# Patient Record
Sex: FEMALE | Race: WHITE | Employment: OTHER | URBAN - METROPOLITAN AREA
[De-identification: names, ages, dates, MRNs, and addresses within clinical notes are randomized per-mention and may not be internally consistent; named-entity substitution may affect disease eponyms.]

---

## 2017-08-01 ENCOUNTER — APPOINTMENT (OUTPATIENT)
Dept: PHYSICAL THERAPY | Facility: HOSPITAL | Age: 52
End: 2017-08-01
Attending: OBSTETRICS & GYNECOLOGY
Payer: COMMERCIAL

## 2017-08-08 ENCOUNTER — HOSPITAL ENCOUNTER (OUTPATIENT)
Dept: PHYSICAL THERAPY | Facility: HOSPITAL | Age: 52
Setting detail: THERAPIES SERIES
End: 2017-08-08
Attending: OBSTETRICS & GYNECOLOGY
Payer: COMMERCIAL

## 2017-08-10 ENCOUNTER — APPOINTMENT (OUTPATIENT)
Dept: PHYSICAL THERAPY | Facility: HOSPITAL | Age: 52
End: 2017-08-10
Attending: OBSTETRICS & GYNECOLOGY
Payer: COMMERCIAL

## 2017-08-17 ENCOUNTER — APPOINTMENT (OUTPATIENT)
Dept: PHYSICAL THERAPY | Facility: HOSPITAL | Age: 52
End: 2017-08-17
Attending: OBSTETRICS & GYNECOLOGY
Payer: COMMERCIAL

## 2017-08-22 ENCOUNTER — APPOINTMENT (OUTPATIENT)
Dept: PHYSICAL THERAPY | Facility: HOSPITAL | Age: 52
End: 2017-08-22
Attending: OBSTETRICS & GYNECOLOGY
Payer: COMMERCIAL

## 2018-06-21 ENCOUNTER — OFFICE VISIT (OUTPATIENT)
Dept: OBGYN CLINIC | Facility: CLINIC | Age: 53
End: 2018-06-21

## 2018-06-21 VITALS
DIASTOLIC BLOOD PRESSURE: 66 MMHG | SYSTOLIC BLOOD PRESSURE: 118 MMHG | BODY MASS INDEX: 24.04 KG/M2 | WEIGHT: 134 LBS | HEART RATE: 72 BPM | HEIGHT: 62.5 IN

## 2018-06-21 DIAGNOSIS — N95.1 HOT FLASHES DUE TO MENOPAUSE: Primary | ICD-10-CM

## 2018-06-21 PROCEDURE — 99386 PREV VISIT NEW AGE 40-64: CPT | Performed by: OBSTETRICS & GYNECOLOGY

## 2018-06-21 RX ORDER — ESTERIFIED ESTROGEN AND METHYLTESTOSTERONE .625; 1.25 MG/1; MG/1
1 TABLET ORAL DAILY
Qty: 30 TABLET | Refills: 11 | Status: SHIPPED | OUTPATIENT
Start: 2018-06-21 | End: 2018-08-09 | Stop reason: DRUGHIGH

## 2018-06-21 NOTE — PROGRESS NOTES
Dawit Sales is a 46year old female [id number] Patient's last menstrual period was 08/05/2007. Patient presents with:  Gyn Problem: hotflashes wt gain  . She has no complaints. Denies postmenopausal bleeding, urinary or sexual issues.   She complains of ho HISTORY:  Family History   Problem Relation Age of Onset   • Heart Disease Father    No breast cancer    MEDICATIONS:    Current Outpatient Prescriptions:   •  Estradiol (DIVIGEL) 1 MG/GM Transdermal Gel, APPLY 1MG TOPICALLY DAILY.  APPLY TO BACK OF THIGH A

## 2018-06-22 ENCOUNTER — TELEPHONE (OUTPATIENT)
Dept: OBGYN CLINIC | Facility: CLINIC | Age: 53
End: 2018-06-22

## 2018-06-27 NOTE — TELEPHONE ENCOUNTER
Patient aware that PA was denied. Case Number [id number]. Good RX coupon info provided to patient. Patient verbalizes understanding.

## 2018-07-23 ENCOUNTER — TELEPHONE (OUTPATIENT)
Dept: OBGYN CLINIC | Facility: CLINIC | Age: 53
End: 2018-07-23

## 2018-07-23 NOTE — TELEPHONE ENCOUNTER
Patient states that her hot flushes are much better but wants to know if she can increase her dose of Estratest. Will check with Dr. Kelly Bang and call patient back.

## 2018-07-26 NOTE — TELEPHONE ENCOUNTER
----- Message from Bryan Dean sent at 7/26/2018  2:45 PM CDT -----  Returning lata call re:test results

## 2018-07-26 NOTE — TELEPHONE ENCOUNTER
Patient informed that higher dose of Estratest was authorized per Dr. Georgette Flowers. Patient will call back once she is ready for her refill.

## 2018-08-08 ENCOUNTER — TELEPHONE (OUTPATIENT)
Dept: OBGYN CLINIC | Facility: CLINIC | Age: 53
End: 2018-08-08

## 2018-08-08 RX ORDER — ESTERIFIED ESTROGEN AND METHYLTESTOSTERONE 1.25; 2.5 MG/1; MG/1
1 TABLET ORAL DAILY
Qty: 30 TABLET | Refills: 10 | Status: SHIPPED | OUTPATIENT
Start: 2018-08-08 | End: 2019-02-12
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
## 2018-08-10 ENCOUNTER — TELEPHONE (OUTPATIENT)
Dept: OBGYN CLINIC | Facility: CLINIC | Age: 53
End: 2018-08-10

## 2018-09-06 ENCOUNTER — TELEPHONE (OUTPATIENT)
Dept: OBGYN CLINIC | Facility: CLINIC | Age: 53
End: 2018-09-06

## 2019-02-13 RX ORDER — ESTERIFIED ESTROGEN AND METHYLTESTOSTERONE 1.25; 2.5 MG/1; MG/1
TABLET ORAL
Qty: 30 TABLET | Refills: 4 | OUTPATIENT
Start: 2019-02-13 | End: 2019-02-18

## 2019-02-19 RX ORDER — ESTERIFIED ESTROGEN AND METHYLTESTOSTERONE 1.25; 2.5 MG/1; MG/1
TABLET ORAL
Qty: 30 TABLET | Refills: 0 | OUTPATIENT
Start: 2019-02-19 | End: 2019-03-04

## 2019-03-04 ENCOUNTER — OFFICE VISIT (OUTPATIENT)
Dept: OBGYN CLINIC | Facility: CLINIC | Age: 54
End: 2019-03-04
Payer: COMMERCIAL

## 2019-03-04 VITALS
SYSTOLIC BLOOD PRESSURE: 118 MMHG | WEIGHT: 138 LBS | BODY MASS INDEX: 24.45 KG/M2 | HEART RATE: 67 BPM | HEIGHT: 63 IN | DIASTOLIC BLOOD PRESSURE: 70 MMHG

## 2019-03-04 DIAGNOSIS — Z01.419 WELL WOMAN EXAM WITH ROUTINE GYNECOLOGICAL EXAM: Primary | ICD-10-CM

## 2019-03-04 DIAGNOSIS — Z12.39 SCREENING FOR MALIGNANT NEOPLASM OF BREAST: ICD-10-CM

## 2019-03-04 PROCEDURE — 99396 PREV VISIT EST AGE 40-64: CPT | Performed by: OBSTETRICS & GYNECOLOGY

## 2019-03-04 RX ORDER — ESTERIFIED ESTROGEN AND METHYLTESTOSTERONE 1.25; 2.5 MG/1; MG/1
1 TABLET ORAL
Qty: 30 TABLET | Refills: 0 | Status: SHIPPED | OUTPATIENT
Start: 2019-03-04 | End: 2019-08-22

## 2019-03-04 NOTE — PROGRESS NOTES
Suzi Alvarenga is a 53year old female [id number] Patient's last menstrual period was 08/05/2007. Patient presents with:  Wellness Visit  . She has no complaints. Denies postmenopausal bleeding, urinary or sexual issues.   Very happy with the testosterone and Substance and Sexual Activity      Alcohol use: Yes        Binge frequency: Daily or almost daily        Comment: WINE DAILY      Drug use: No      Sexual activity: Not on file    Lifestyle      Physical activity:        Days per week: Not on file        M daily., Disp: 60 tablet, Rfl: 3    ALLERGIES:  No Known Allergies      Review of Systems:  Constitutional:  Denies fatigue, night sweats, hot flashed  Gastrointestinal:  denies heartburn, abdominal pain, diarrhea or constipation  Genitourinary:  denies dys

## 2019-06-04 ENCOUNTER — HOSPITAL ENCOUNTER (OUTPATIENT)
Age: 54
Discharge: HOME | End: 2019-06-04
Attending: EMERGENCY MEDICINE
Payer: COMMERCIAL

## 2019-06-04 VITALS
OXYGEN SATURATION: 100 % | TEMPERATURE: 98 F | RESPIRATION RATE: 14 BRPM | WEIGHT: 135 LBS | HEART RATE: 60 BPM | HEIGHT: 63 IN | SYSTOLIC BLOOD PRESSURE: 142 MMHG | DIASTOLIC BLOOD PRESSURE: 85 MMHG | BODY MASS INDEX: 23.92 KG/M2

## 2019-06-04 DIAGNOSIS — H10.33 ACUTE CONJUNCTIVITIS OF BOTH EYES, UNSPECIFIED ACUTE CONJUNCTIVITIS TYPE: Primary | ICD-10-CM

## 2019-06-04 PROCEDURE — 99203 OFFICE O/P NEW LOW 30 MIN: CPT

## 2019-06-04 PROCEDURE — 99213 OFFICE O/P EST LOW 20 MIN: CPT

## 2019-06-04 RX ORDER — CETIRIZINE HYDROCHLORIDE 10 MG/1
10 TABLET ORAL DAILY
COMMUNITY
End: 2021-01-28 | Stop reason: ALTCHOICE

## 2019-06-04 RX ORDER — TOBRAMYCIN 3 MG/ML
1 SOLUTION/ DROPS OPHTHALMIC
Qty: 1 BOTTLE | Refills: 0 | Status: SHIPPED | OUTPATIENT
Start: 2019-06-04 | End: 2019-06-09

## 2019-06-04 NOTE — ED PROVIDER NOTES
Patient Seen in: [facility]    History   Patient presents with:  Eye Problem    Stated Complaint: eye problem    HPI    41-year-old female complains of a couple day history of bilateral eye itching and redness.   The patient HEENT: Normocephalic. Pupils equal round reactive to light. Extraocular movements are intact. There is bilateral conjunctival injection without photophobia or active tearing, left greater than right.   Additionally there is the beginnings of a blepharit

## 2019-06-04 NOTE — ED INITIAL ASSESSMENT (HPI)
Pt c/o redness and irritation to the corner of right eye and left eye x 2 days after using a new brand mascara from her birch box. Eyes have been watery. Denies visual complaints.

## 2019-06-29 ENCOUNTER — APPOINTMENT (OUTPATIENT)
Dept: GENERAL RADIOLOGY | Age: 54
End: 2019-06-29
Attending: FAMILY MEDICINE
Payer: COMMERCIAL

## 2019-06-29 ENCOUNTER — HOSPITAL ENCOUNTER (OUTPATIENT)
Age: 54
Discharge: HOME | End: 2019-06-29
Attending: FAMILY MEDICINE
Payer: COMMERCIAL

## 2019-06-29 VITALS
DIASTOLIC BLOOD PRESSURE: 88 MMHG | HEIGHT: 63 IN | SYSTOLIC BLOOD PRESSURE: 152 MMHG | RESPIRATION RATE: 18 BRPM | OXYGEN SATURATION: 98 % | BODY MASS INDEX: 23.92 KG/M2 | WEIGHT: 135 LBS | HEART RATE: 74 BPM | TEMPERATURE: 100 F

## 2019-06-29 DIAGNOSIS — H66.90 ACUTE OTITIS MEDIA, UNSPECIFIED OTITIS MEDIA TYPE: ICD-10-CM

## 2019-06-29 DIAGNOSIS — J18.9 PNEUMONIA OF RIGHT UPPER LOBE DUE TO INFECTIOUS ORGANISM: Primary | ICD-10-CM

## 2019-06-29 PROCEDURE — 71046 X-RAY EXAM CHEST 2 VIEWS: CPT | Performed by: FAMILY MEDICINE

## 2019-06-29 PROCEDURE — 99213 OFFICE O/P EST LOW 20 MIN: CPT

## 2019-06-29 PROCEDURE — 99214 OFFICE O/P EST MOD 30 MIN: CPT

## 2019-06-29 RX ORDER — LEVOFLOXACIN 500 MG/1
500 TABLET, FILM COATED ORAL DAILY
Qty: 10 TABLET | Refills: 0 | Status: SHIPPED | OUTPATIENT
Start: 2019-06-29 | End: 2019-07-09 | Stop reason: ALTCHOICE

## 2019-06-29 RX ORDER — IBUPROFEN 600 MG/1
600 TABLET ORAL ONCE
Status: COMPLETED | OUTPATIENT
Start: 2019-06-29 | End: 2019-06-29

## 2019-06-29 NOTE — ED PROVIDER NOTES
Patient Seen in: [facility]    History   Patient presents with:  Fever (infectious)  Cough/URI  Ear Problem Pain (neurosensory)    Stated Complaint: fever and upper respiratory  infection per patient x Tuesday     HPI    53 above.    Physical Exam     ED Triage Vitals [06/29/19 0818]   /88   Pulse 74   Resp 18   Temp 100.4 °F (38 °C)   Temp src Oral   SpO2 98 %   O2 Device None (Room air)       Current:/88   Pulse 74   Temp 100.4 °F (38 °C) (Oral)   Resp 18   Ht 1 membrane is partially erythematous.               Disposition and Plan     Clinical Impression:  Pneumonia of right upper lobe due to infectious organism [facility])  (primary encounter diagnosis)  Acute otitis media, unspecified otitis media type    Disposition:

## 2019-06-29 NOTE — ED INITIAL ASSESSMENT (HPI)
Pt arrived alert and responsive. Pt c/o fever ,cough and right ear pain. Pt states symptoms started Tuesday.

## 2019-07-01 ENCOUNTER — HOSPITAL ENCOUNTER (OUTPATIENT)
Age: 54
Discharge: HOME | End: 2019-07-01
Attending: EMERGENCY MEDICINE
Payer: COMMERCIAL

## 2019-07-01 VITALS
OXYGEN SATURATION: 98 % | WEIGHT: 135 LBS | BODY MASS INDEX: 23.92 KG/M2 | DIASTOLIC BLOOD PRESSURE: 96 MMHG | HEART RATE: 54 BPM | RESPIRATION RATE: 18 BRPM | TEMPERATURE: 98 F | HEIGHT: 63 IN | SYSTOLIC BLOOD PRESSURE: 163 MMHG

## 2019-07-01 DIAGNOSIS — T50.905A MEDICATION SIDE EFFECT, INITIAL ENCOUNTER: Primary | ICD-10-CM

## 2019-07-01 PROCEDURE — 99213 OFFICE O/P EST LOW 20 MIN: CPT

## 2019-07-01 PROCEDURE — 99214 OFFICE O/P EST MOD 30 MIN: CPT

## 2019-07-01 RX ORDER — CLARITHROMYCIN 500 MG/1
500 TABLET, COATED ORAL 2 TIMES DAILY
Qty: 14 TABLET | Refills: 0 | Status: SHIPPED | OUTPATIENT
Start: 2019-07-01 | End: 2019-07-08

## 2019-07-01 NOTE — ED INITIAL ASSESSMENT (HPI)
Pt states she awoke at 0200. Pt felt tingling/ numbness. in her fingers pt states she was able to get back to sleep. Pt states she continues to have tingling . Pt also c/o nausea. Pt started levaquin  Saturday.  and is concerned that may be causing her symp

## 2019-07-01 NOTE — ED NOTES
Patient called,stating felt tongue & hands felt strange during the night also thought she was having palpitations even though she was not when check her heart rate on her Apple watch. This AM patient denies any of the above issues stated.   Patient denies a

## 2019-07-01 NOTE — ED PROVIDER NOTES
Patient Seen in: [facility]    History   Patient presents with:  Nausea/Vomiting/Diarrhea (gastrointestinal)    Stated Complaint: nausea     HPI    Patient was to the urgent care just a few days ago.   She was complaining of Family history reviewed and is not pertinent to presenting problem.     Social History    Tobacco Use      Smoking status: Never Smoker      Smokeless tobacco: Never Used    Alcohol use: Yes      Binge frequency: Daily or almost daily      Comment: WI suggest dangerous allergic reaction. I discussed with patient that  If she is having significant ill effects from the Levaquin that one could consider switching antibiotic. Biaxin would cover atypicals and be a reasonable choice.   Patient notes that she

## 2019-07-05 RX ORDER — BENZONATATE 100 MG/1
100 CAPSULE ORAL 3 TIMES DAILY PRN
Qty: 15 CAPSULE | Refills: 0 | Status: SHIPPED | OUTPATIENT
Start: 2019-07-05 | End: 2019-07-10

## 2019-07-05 NOTE — ED NOTES
Patient called and inquired about a cough suppressant  Denies any fever or chills  Denies any SOB or difficulty breathing    RN consulted Dr Virgie Newton- electronic Rx sent to pharmacy   Patient verbalized understanding to follow up with PCP or immediate care

## 2019-08-14 ENCOUNTER — OFFICE VISIT (OUTPATIENT)
Dept: PODIATRY CLINIC | Facility: CLINIC | Age: 54
End: 2019-08-14
Payer: COMMERCIAL

## 2019-08-14 DIAGNOSIS — M20.42 HAMMER TOES OF BOTH FEET: ICD-10-CM

## 2019-08-14 DIAGNOSIS — M21.621 TAILOR'S BUNION OF BOTH FEET: ICD-10-CM

## 2019-08-14 DIAGNOSIS — M79.672 FOOT PAIN, LEFT: ICD-10-CM

## 2019-08-14 DIAGNOSIS — M20.41 HAMMER TOES OF BOTH FEET: ICD-10-CM

## 2019-08-14 DIAGNOSIS — M21.622 TAILOR'S BUNION OF BOTH FEET: ICD-10-CM

## 2019-08-14 DIAGNOSIS — G57.80 NEUROMA OF THIRD INTERSPACE OF FOOT: ICD-10-CM

## 2019-08-14 DIAGNOSIS — M76.72 PERONEAL TENDONITIS, LEFT: Primary | ICD-10-CM

## 2019-08-14 DIAGNOSIS — M20.10 HAV (HALLUX ABDUCTO VALGUS), UNSPECIFIED LATERALITY: ICD-10-CM

## 2019-08-14 DIAGNOSIS — M19.079 OSTEOARTHRITIS OF ANKLE AND FOOT, UNSPECIFIED LATERALITY: ICD-10-CM

## 2019-08-14 PROCEDURE — 99213 OFFICE O/P EST LOW 20 MIN: CPT | Performed by: PODIATRIST

## 2019-08-16 ENCOUNTER — TELEPHONE (OUTPATIENT)
Dept: PODIATRY CLINIC | Facility: CLINIC | Age: 54
End: 2019-08-16

## 2019-08-16 NOTE — TELEPHONE ENCOUNTER
Contacted patient about orthotic coverage:    Per Page at [facility]#[phone]    50% coverage after $3000 deductible (not met). Patient cost for orthotics $848.     Patient will call her insurance to make sure this is correct and if she wants to proce

## 2019-08-16 NOTE — PROGRESS NOTES
Sun Shane is a 53year old female. Patient presents with: Follow - Up: Ultrasound done on 8-1-2019 with Irma Parrish. Left foot is feeling better, ankle is still swollen a little. 0/10 right now, in the mornings 4-5/10.         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    Diagnostic Ultrasound Studies (8/1/2019):  Findings show flat distal fibular groove bilaterally with otherwi

## 2019-08-22 RX ORDER — ESTERIFIED ESTROGEN AND METHYLTESTOSTERONE 1.25; 2.5 MG/1; MG/1
1 TABLET ORAL
Qty: 30 TABLET | Refills: 0 | Status: SHIPPED | OUTPATIENT
Start: 2019-08-22 | End: 2019-11-17

## 2019-11-18 RX ORDER — ESTERIFIED ESTROGEN AND METHYLTESTOSTERONE 1.25; 2.5 MG/1; MG/1
TABLET ORAL
Qty: 30 TABLET | Refills: 0 | Status: SHIPPED | OUTPATIENT
Start: 2019-11-18 | End: 2020-01-01

## 2019-11-19 ENCOUNTER — TELEPHONE (OUTPATIENT)
Dept: OBGYN CLINIC | Facility: CLINIC | Age: 54
End: 2019-11-19

## 2020-01-02 ENCOUNTER — TELEPHONE (OUTPATIENT)
Dept: OBGYN CLINIC | Facility: CLINIC | Age: 55
End: 2020-01-02

## 2020-01-02 RX ORDER — ESTERIFIED ESTROGEN AND METHYLTESTOSTERONE 1.25; 2.5 MG/1; MG/1
TABLET ORAL
Qty: 30 TABLET | Refills: 2 | Status: SHIPPED | OUTPATIENT
Start: 2020-01-02 | End: 2020-03-30

## 2020-01-02 RX ORDER — ESTERIFIED ESTROGEN AND METHYLTESTOSTERONE 1.25; 2.5 MG/1; MG/1
TABLET ORAL
Qty: 30 TABLET | Refills: 2 | OUTPATIENT
Start: 2020-01-02 | End: 2020-01-02

## 2020-03-30 RX ORDER — ESTERIFIED ESTROGEN AND METHYLTESTOSTERONE 1.25; 2.5 MG/1; MG/1
TABLET ORAL
Qty: 30 TABLET | Refills: 0 | Status: SHIPPED | OUTPATIENT
Start: 2020-03-30 | End: 2020-04-28

## 2020-04-02 ENCOUNTER — TELEPHONE (OUTPATIENT)
Dept: OBGYN CLINIC | Facility: CLINIC | Age: 55
End: 2020-04-02

## 2020-04-02 NOTE — TELEPHONE ENCOUNTER
Spoke with pharmacist regarding auto refill for this patient. This patient is currently in [place] due to the [address] place order. Filled  1 time request to [facility] in [place] and explained that to the pharmacist at [place].  Auto requ

## 2020-04-02 NOTE — TELEPHONE ENCOUNTER
Per Timmy Patricio from [place], she just called to f/u on a request for medication for the pt. Please advise and call pt.  Thanks

## 2020-04-28 RX ORDER — ESTERIFIED ESTROGEN AND METHYLTESTOSTERONE 1.25; 2.5 MG/1; MG/1
TABLET ORAL
Qty: 30 TABLET | Refills: 0 | Status: SHIPPED | OUTPATIENT
Start: 2020-04-28 | End: 2020-04-30

## 2020-04-28 NOTE — TELEPHONE ENCOUNTER
Pt just called to let us know that her pharmacy in [place] sent us a request for her medication. She made her wwe appt for June because we cancelled the appt for May. Please advise and check request from pharmacy.  Thanks

## 2020-04-30 ENCOUNTER — TELEPHONE (OUTPATIENT)
Dept: OBGYN CLINIC | Facility: CLINIC | Age: 55
End: 2020-04-30

## 2020-04-30 RX ORDER — ESTERIFIED ESTROGEN AND METHYLTESTOSTERONE 1.25; 2.5 MG/1; MG/1
TABLET ORAL
Qty: 30 TABLET | Refills: 0 | Status: SHIPPED | OUTPATIENT
Start: 2020-04-30 | End: 2020-06-02

## 2020-04-30 NOTE — TELEPHONE ENCOUNTER
Patient called regarding prescription. Routed. Called [facility] in [facility] to make sure they received. Patient aware and understanding verbalized.

## 2020-06-02 ENCOUNTER — OFFICE VISIT (OUTPATIENT)
Dept: OBGYN CLINIC | Facility: CLINIC | Age: 55
End: 2020-06-02
Payer: COMMERCIAL

## 2020-06-02 VITALS
HEIGHT: 63 IN | BODY MASS INDEX: 24.27 KG/M2 | HEART RATE: 83 BPM | WEIGHT: 137 LBS | SYSTOLIC BLOOD PRESSURE: 110 MMHG | DIASTOLIC BLOOD PRESSURE: 62 MMHG

## 2020-06-02 DIAGNOSIS — Z01.419 WELL WOMAN EXAM WITH ROUTINE GYNECOLOGICAL EXAM: ICD-10-CM

## 2020-06-02 DIAGNOSIS — Z12.31 ENCOUNTER FOR SCREENING MAMMOGRAM FOR MALIGNANT NEOPLASM OF BREAST: Primary | ICD-10-CM

## 2020-06-02 DIAGNOSIS — N95.1 HOT FLASHES DUE TO MENOPAUSE: ICD-10-CM

## 2020-06-02 PROCEDURE — 99396 PREV VISIT EST AGE 40-64: CPT | Performed by: OBSTETRICS & GYNECOLOGY

## 2020-06-02 RX ORDER — ESTERIFIED ESTROGEN AND METHYLTESTOSTERONE 1.25; 2.5 MG/1; MG/1
TABLET ORAL
Qty: 30 TABLET | Refills: 0 | Status: SHIPPED | OUTPATIENT
Start: 2020-06-02 | End: 2020-07-09

## 2020-06-02 RX ORDER — FLUTICASONE PROPIONATE 50 MCG
SPRAY, SUSPENSION (ML) NASAL DAILY
COMMUNITY
End: 2021-01-28 | Stop reason: ALTCHOICE

## 2020-06-02 NOTE — PROGRESS NOTES
Miguel Gamble is a 63year old female [id number] Patient's last menstrual period was 08/05/2007. Patient presents with:  Wellness Visit  . She keeps up with her medication after intercourse she does not have bladder infections.   She is complaining of cons Medical: Not on file        Non-medical: Not on file    Tobacco Use      Smoking status: Never Smoker      Smokeless tobacco: Never Used    Substance and Sexual Activity      Alcohol use: Yes        Binge frequency: Daily or almost daily        Comment: WI Lactobacillus (PROBIOTIC ACIDOPHILUS OR), Take by mouth., Disp: , Rfl:   •  Cholecalciferol (VITAMIN D-3) 5000 UNITS Oral Tab, Take 1 tablet by mouth daily. , Disp: , Rfl:     ALLERGIES:  No Known Allergies      Review of Systems:  Constitutional:  Denies f Future    Other orders  -     Est Estrogens-Methyltest 1.25-2.5 MG Oral Tab; TAKE 1 TABLET BY MOUTH EVERY DAY

## 2020-07-09 RX ORDER — ESTERIFIED ESTROGEN AND METHYLTESTOSTERONE 1.25; 2.5 MG/1; MG/1
TABLET ORAL
Qty: 30 TABLET | Refills: 0 | Status: SHIPPED | OUTPATIENT
Start: 2020-07-09 | End: 2020-08-17

## 2020-07-22 ENCOUNTER — LABORATORY ENCOUNTER (OUTPATIENT)
Dept: LAB | Age: 55
End: 2020-07-22
Attending: OBSTETRICS & GYNECOLOGY
Payer: COMMERCIAL

## 2020-07-22 DIAGNOSIS — Z01.419 WELL WOMAN EXAM WITH ROUTINE GYNECOLOGICAL EXAM: ICD-10-CM

## 2020-07-22 LAB
ALBUMIN SERPL-MCNC: 3.8 G/DL (ref 3.4–5)
ALBUMIN/GLOB SERPL: 1.2 {RATIO} (ref 1–2)
ALP LIVER SERPL-CCNC: 49 U/L (ref 46–118)
ALT SERPL-CCNC: 22 U/L (ref 13–56)
ANION GAP SERPL CALC-SCNC: 2 MMOL/L (ref 0–18)
AST SERPL-CCNC: 23 U/L (ref 15–37)
BASOPHILS # BLD AUTO: 0.05 X10(3) UL (ref 0–0.2)
BASOPHILS NFR BLD AUTO: 0.7 %
BILIRUB SERPL-MCNC: 0.6 MG/DL (ref 0.1–2)
BUN BLD-MCNC: 12 MG/DL (ref 7–18)
BUN/CREAT SERPL: 14 (ref 10–20)
CALCIUM BLD-MCNC: 8.7 MG/DL (ref 8.5–10.1)
CHLORIDE SERPL-SCNC: 107 MMOL/L (ref 98–112)
CHOLEST SMN-MCNC: 240 MG/DL (ref ?–200)
CO2 SERPL-SCNC: 30 MMOL/L (ref 21–32)
CREAT BLD-MCNC: 0.86 MG/DL (ref 0.55–1.02)
DEPRECATED RDW RBC AUTO: 45.8 FL (ref 35.1–46.3)
EOSINOPHIL # BLD AUTO: 0.13 X10(3) UL (ref 0–0.7)
EOSINOPHIL NFR BLD AUTO: 1.8 %
ERYTHROCYTE [DISTWIDTH] IN BLOOD BY AUTOMATED COUNT: 12.8 % (ref 11–15)
GLOBULIN PLAS-MCNC: 3.1 G/DL (ref 2.8–4.4)
GLUCOSE BLD-MCNC: 84 MG/DL (ref 70–99)
HCT VFR BLD AUTO: 46.3 % (ref 35–48)
HDLC SERPL-MCNC: 74 MG/DL (ref 40–59)
HGB BLD-MCNC: 15 G/DL (ref 12–16)
IMM GRANULOCYTES # BLD AUTO: 0.02 X10(3) UL (ref 0–1)
IMM GRANULOCYTES NFR BLD: 0.3 %
LDLC SERPL CALC-MCNC: 148 MG/DL (ref ?–100)
LYMPHOCYTES # BLD AUTO: 1.7 X10(3) UL (ref 1–4)
LYMPHOCYTES NFR BLD AUTO: 22.9 %
M PROTEIN MFR SERPL ELPH: 6.9 G/DL (ref 6.4–8.2)
MCH RBC QN AUTO: 31.5 PG (ref 26–34)
MCHC RBC AUTO-ENTMCNC: 32.4 G/DL (ref 31–37)
MCV RBC AUTO: 97.3 FL (ref 80–100)
MONOCYTES # BLD AUTO: 0.64 X10(3) UL (ref 0.1–1)
MONOCYTES NFR BLD AUTO: 8.6 %
NEUTROPHILS # BLD AUTO: 4.88 X10 (3) UL (ref 1.5–7.7)
NEUTROPHILS # BLD AUTO: 4.88 X10(3) UL (ref 1.5–7.7)
NEUTROPHILS NFR BLD AUTO: 65.7 %
NONHDLC SERPL-MCNC: 166 MG/DL (ref ?–130)
OSMOLALITY SERPL CALC.SUM OF ELEC: 287 MOSM/KG (ref 275–295)
PATIENT FASTING Y/N/NP: YES
PATIENT FASTING Y/N/NP: YES
PLATELET # BLD AUTO: 268 10(3)UL (ref 150–450)
POTASSIUM SERPL-SCNC: 4.5 MMOL/L (ref 3.5–5.1)
RBC # BLD AUTO: 4.76 X10(6)UL (ref 3.8–5.3)
SODIUM SERPL-SCNC: 139 MMOL/L (ref 136–145)
TRIGL SERPL-MCNC: 92 MG/DL (ref 30–149)
TSI SER-ACNC: 1.9 MIU/ML (ref 0.36–3.74)
VLDLC SERPL CALC-MCNC: 18 MG/DL (ref 0–30)
WBC # BLD AUTO: 7.4 X10(3) UL (ref 4–11)

## 2020-07-22 PROCEDURE — 36415 COLL VENOUS BLD VENIPUNCTURE: CPT | Performed by: OBSTETRICS & GYNECOLOGY

## 2020-07-22 PROCEDURE — 80050 GENERAL HEALTH PANEL: CPT | Performed by: OBSTETRICS & GYNECOLOGY

## 2020-07-22 PROCEDURE — 80061 LIPID PANEL: CPT | Performed by: OBSTETRICS & GYNECOLOGY

## 2020-07-31 NOTE — PROGRESS NOTES
Patient aware of results and recommendations for low cholesterol and low glucose diet .  Patient verbalizes understanding

## 2020-08-17 ENCOUNTER — TELEPHONE (OUTPATIENT)
Dept: OBGYN CLINIC | Facility: CLINIC | Age: 55
End: 2020-08-17

## 2020-08-17 NOTE — TELEPHONE ENCOUNTER
Patient called and changed her pharmacy, which I did change to [facility]. Please order her prescription that has to be called in to this pharmacy as she has only 3 left and needs asap.     She said you would know the medication

## 2020-09-08 ENCOUNTER — TELEPHONE (OUTPATIENT)
Dept: OBGYN CLINIC | Facility: CLINIC | Age: 55
End: 2020-09-08

## 2020-09-09 RX ORDER — ESTERIFIED ESTROGEN AND METHYLTESTOSTERONE 1.25; 2.5 MG/1; MG/1
TABLET ORAL
Qty: 30 TABLET | Refills: 5 | Status: SHIPPED | OUTPATIENT
Start: 2020-09-09 | End: 2021-01-29

## 2020-09-15 NOTE — TELEPHONE ENCOUNTER
Refill not received for  EST ESTROGENS-METHYLTEST 1.25-2.5 MG Oral Tab    Will need a digital signature    [id number] [facility], [address] [facility], [phone], [phone]

## 2020-11-09 ENCOUNTER — TELEMEDICINE (OUTPATIENT)
Dept: TELEHEALTH | Age: 55
End: 2020-11-09
Payer: COMMERCIAL

## 2020-11-09 DIAGNOSIS — Z02.9 ADMINISTRATIVE ENCOUNTER: Primary | ICD-10-CM

## 2020-11-09 PROCEDURE — 99998 NO SHOW: CPT | Performed by: NURSE PRACTITIONER

## 2020-11-11 NOTE — PROGRESS NOTES
Patient did not enter the visit, so I called her. Pt thought that she was going to have a Video Visit with her DMG provided. Pt currently en route to the [facility] in [address] [place] [address]. Video Visit cancelled with no charge.

## 2021-01-21 ENCOUNTER — PATIENT MESSAGE (OUTPATIENT)
Dept: OBGYN CLINIC | Facility: CLINIC | Age: 56
End: 2021-01-21

## 2021-01-28 NOTE — TELEPHONE ENCOUNTER
From: Esmer Toledo  To: Giulia Whitman MD  Sent: 1/21/2021 4:11 PM CST  Subject: Test Results Question    For Dr Kelly Bang  2 questions:    Need my HRT. Someone was supposed to call me back. [facility] will not refill. Can you please help?     I just had a calc

## 2021-01-29 RX ORDER — ESTERIFIED ESTROGEN AND METHYLTESTOSTERONE 1.25; 2.5 MG/1; MG/1
1 TABLET ORAL DAILY
Qty: 30 TABLET | Refills: 5 | Status: SHIPPED | OUTPATIENT
Start: 2021-01-29 | End: 2021-08-03

## 2021-02-27 NOTE — TELEPHONE ENCOUNTER
Per dr Amber Brock we gave a verbal consent because the prescription was sent on 01-. The pharmacy had some technical problems and could not see the prescription. Everything should be resolved. Please advise if anything else needs to be done.  Thanks

## 2021-03-03 RX ORDER — ESTERIFIED ESTROGEN AND METHYLTESTOSTERONE 1.25; 2.5 MG/1; MG/1
TABLET ORAL
Qty: 30 TABLET | Refills: 0 | OUTPATIENT
Start: 2021-03-03

## 2021-03-04 ENCOUNTER — TELEPHONE (OUTPATIENT)
Dept: OBGYN CLINIC | Facility: CLINIC | Age: 56
End: 2021-03-04

## 2021-03-04 NOTE — TELEPHONE ENCOUNTER
----- Message from Ishaan Coello sent at 3/1/2021 10:46 AM CST -----  Returning bethany's call. wants to make sure we have correct info for [facility] in [place].

## 2021-05-24 ENCOUNTER — HOSPITAL ENCOUNTER (OUTPATIENT)
Age: 56
Discharge: HOME | End: 2021-05-24
Payer: COMMERCIAL

## 2021-05-24 VITALS
DIASTOLIC BLOOD PRESSURE: 99 MMHG | HEIGHT: 62 IN | BODY MASS INDEX: 23.92 KG/M2 | SYSTOLIC BLOOD PRESSURE: 151 MMHG | HEART RATE: 53 BPM | OXYGEN SATURATION: 100 % | RESPIRATION RATE: 16 BRPM | WEIGHT: 130 LBS | TEMPERATURE: 98 F

## 2021-05-24 DIAGNOSIS — J06.9 VIRAL URI WITH COUGH: Primary | ICD-10-CM

## 2021-05-24 PROCEDURE — 99213 OFFICE O/P EST LOW 20 MIN: CPT | Performed by: NURSE PRACTITIONER

## 2021-05-24 RX ORDER — PREDNISONE 20 MG/1
40 TABLET ORAL DAILY
Qty: 10 TABLET | Refills: 0 | Status: SHIPPED | OUTPATIENT
Start: 2021-05-24 | End: 2021-05-29

## 2021-05-24 RX ORDER — BENZONATATE 100 MG/1
100 CAPSULE ORAL 3 TIMES DAILY PRN
Qty: 30 CAPSULE | Refills: 0 | Status: SHIPPED | OUTPATIENT
Start: 2021-05-24 | End: 2021-06-23

## 2021-05-24 NOTE — ED INITIAL ASSESSMENT (HPI)
May 7th, c/o scratchy throat and cold-like symptoms. C/o post nasal drip with cough. Not sleeping well. Using several OTC meds with no relief. Denies fevers.

## 2021-05-24 NOTE — ED PROVIDER NOTES
Patient Seen in: Immediate [address] [place] [address]      History   Patient presents with:  Cough/URI    Stated Complaint: POST NASAL DRIP / COUGH /     HPI/Subjective: This is a 58-year-old female with below stated medical history.   Presents to immediate Genitourinary: Negative for dysuria. Musculoskeletal: Negative for back pain and neck pain. Skin: Negative for rash. Allergic/Immunologic: Negative for environmental allergies. Neurological: Negative for headaches.    Hematological: Does not bruis wheezing, rhonchi or rales. Musculoskeletal:      Cervical back: Neck supple. Right lower leg: No edema. Left lower leg: No edema. Skin:     General: Skin is warm and dry. Capillary Refill: Capillary refill takes less than 2 seconds. capsule, R-0

## 2021-08-03 RX ORDER — ESTERIFIED ESTROGEN AND METHYLTESTOSTERONE 1.25; 2.5 MG/1; MG/1
TABLET ORAL
Qty: 30 TABLET | Refills: 0 | Status: SHIPPED | OUTPATIENT
Start: 2021-08-03 | End: 2021-08-09

## 2021-08-09 ENCOUNTER — TELEPHONE (OUTPATIENT)
Dept: OBGYN CLINIC | Facility: CLINIC | Age: 56
End: 2021-08-09

## 2021-08-09 ENCOUNTER — OFFICE VISIT (OUTPATIENT)
Dept: OBGYN CLINIC | Facility: CLINIC | Age: 56
End: 2021-08-09
Payer: COMMERCIAL

## 2021-08-09 VITALS
DIASTOLIC BLOOD PRESSURE: 56 MMHG | BODY MASS INDEX: 23.68 KG/M2 | WEIGHT: 132 LBS | SYSTOLIC BLOOD PRESSURE: 114 MMHG | HEIGHT: 62.5 IN | HEART RATE: 52 BPM

## 2021-08-09 DIAGNOSIS — Z12.31 ENCOUNTER FOR SCREENING MAMMOGRAM FOR BREAST CANCER: Primary | ICD-10-CM

## 2021-08-09 DIAGNOSIS — Z01.419 WELL WOMAN EXAM WITH ROUTINE GYNECOLOGICAL EXAM: ICD-10-CM

## 2021-08-09 PROCEDURE — 3008F BODY MASS INDEX DOCD: CPT | Performed by: OBSTETRICS & GYNECOLOGY

## 2021-08-09 PROCEDURE — 99396 PREV VISIT EST AGE 40-64: CPT | Performed by: OBSTETRICS & GYNECOLOGY

## 2021-08-09 PROCEDURE — 3078F DIAST BP <80 MM HG: CPT | Performed by: OBSTETRICS & GYNECOLOGY

## 2021-08-09 PROCEDURE — 3074F SYST BP LT 130 MM HG: CPT | Performed by: OBSTETRICS & GYNECOLOGY

## 2021-08-09 RX ORDER — ESTERIFIED ESTROGEN AND METHYLTESTOSTERONE 1.25; 2.5 MG/1; MG/1
1 TABLET ORAL DAILY
Qty: 30 TABLET | Refills: 11 | Status: SHIPPED | OUTPATIENT
Start: 2021-08-09 | End: 2021-12-30

## 2021-08-09 RX ORDER — VALACYCLOVIR HYDROCHLORIDE 500 MG/1
500 TABLET, FILM COATED ORAL 2 TIMES DAILY
Qty: 10 TABLET | Refills: 0 | Status: SHIPPED | OUTPATIENT
Start: 2021-08-09 | End: 2021-08-09

## 2021-08-09 RX ORDER — VALACYCLOVIR HYDROCHLORIDE 500 MG/1
500 TABLET, FILM COATED ORAL DAILY
Qty: 90 TABLET | Refills: 5 | Status: SHIPPED | OUTPATIENT
Start: 2021-08-09

## 2021-08-09 NOTE — PROGRESS NOTES
Jovany Jain is a 61year old female [id number] Patient's last menstrual period was 08/05/2007. Patient presents with:  Wellness Visit  . She is on estrogen testosterone and estrogen no hot flashes or vaginal dryness is wishes to stay on that.   No hirsut activity: Not on file    Other Topics      Concerns:         Service: Not Asked        Blood Transfusions: Not Asked        Caffeine Concern: No          Coffee        Occupational Exposure: Not Asked        Hobby Hazards: Not Asked        Sleep Co 1. 25-2.5 MG Oral Tab, TAKE 1 TABLET BY MOUTH EVERY DAY, Disp: 30 tablet, Rfl: 0  •  Lactobacillus (PROBIOTIC ACIDOPHILUS OR), Take by mouth.  , Disp: , Rfl:   •  Cholecalciferol (VITAMIN D-3) 5000 UNITS Oral Tab, Take 1 tablet by mouth daily. , Disp: , Rfl: PANEL; Future  -     COMP METABOLIC PANEL (14); Future        Estrogen and testosterone.

## 2021-12-30 ENCOUNTER — OFFICE VISIT (OUTPATIENT)
Dept: OBGYN CLINIC | Facility: CLINIC | Age: 56
End: 2021-12-30
Payer: COMMERCIAL

## 2021-12-30 VITALS
WEIGHT: 135 LBS | SYSTOLIC BLOOD PRESSURE: 120 MMHG | BODY MASS INDEX: 23.92 KG/M2 | HEART RATE: 65 BPM | DIASTOLIC BLOOD PRESSURE: 60 MMHG | HEIGHT: 63 IN

## 2021-12-30 DIAGNOSIS — N76.0 VAGINITIS AND VULVOVAGINITIS: Primary | ICD-10-CM

## 2021-12-30 PROCEDURE — 3008F BODY MASS INDEX DOCD: CPT | Performed by: OBSTETRICS & GYNECOLOGY

## 2021-12-30 PROCEDURE — 3074F SYST BP LT 130 MM HG: CPT | Performed by: OBSTETRICS & GYNECOLOGY

## 2021-12-30 PROCEDURE — 87480 CANDIDA DNA DIR PROBE: CPT | Performed by: OBSTETRICS & GYNECOLOGY

## 2021-12-30 PROCEDURE — 87660 TRICHOMONAS VAGIN DIR PROBE: CPT | Performed by: OBSTETRICS & GYNECOLOGY

## 2021-12-30 PROCEDURE — 3078F DIAST BP <80 MM HG: CPT | Performed by: OBSTETRICS & GYNECOLOGY

## 2021-12-30 PROCEDURE — 99212 OFFICE O/P EST SF 10 MIN: CPT | Performed by: OBSTETRICS & GYNECOLOGY

## 2021-12-30 PROCEDURE — 87510 GARDNER VAG DNA DIR PROBE: CPT | Performed by: OBSTETRICS & GYNECOLOGY

## 2022-01-01 ENCOUNTER — TELEPHONE (OUTPATIENT)
Dept: OBGYN CLINIC | Facility: CLINIC | Age: 57
End: 2022-01-01

## 2022-01-01 DIAGNOSIS — Z01.419 WELL WOMAN EXAM: ICD-10-CM

## 2022-01-01 DIAGNOSIS — Z01.89 LABORATORY TEST: Primary | ICD-10-CM

## 2022-01-01 RX ORDER — ESTERIFIED ESTROGEN AND METHYLTESTOSTERONE 1.25; 2.5 MG/1; MG/1
1 TABLET ORAL DAILY
COMMUNITY
End: 2022-01-04

## 2022-01-04 ENCOUNTER — TELEPHONE (OUTPATIENT)
Dept: OBGYN CLINIC | Facility: CLINIC | Age: 57
End: 2022-01-04

## 2022-01-04 RX ORDER — ESTERIFIED ESTROGEN AND METHYLTESTOSTERONE 1.25; 2.5 MG/1; MG/1
1 TABLET ORAL DAILY
Qty: 30 TABLET | Refills: 7 | Status: SHIPPED | OUTPATIENT
Start: 2022-01-04

## 2022-01-04 NOTE — PROGRESS NOTES
Gerry Chino,   Your vaginal swab results are normal. I encourage you to learn more about each test by accessing the great resources available through [employer].  Click on the test name if you would like to see a description of the test. I hope this information is
Pt advised of normal result. Understanding verbalized. Would dmitry to know what to do about increased discharge and pimple like bump. Will route to provider for advisement. Per pt, not any worse and does not have pain.  Is currently using Cortizone cream pe
She has a recurring lesion that comes and goes it comes to ahead it is by her clitoris. Does not hurt unless she touches it. Also complaining of increased vaginal discharge.   Move the slightly right of the clitoris and now by half a centimeters looks lik
moderate

## 2022-01-04 NOTE — TELEPHONE ENCOUNTER
Patient noticed at a recent OV with a different provider that her HRT had been discontinued. Per pt, is still taking it. Will need new rx,, will route to provider.

## 2022-02-22 RX ORDER — ESTERIFIED ESTROGEN AND METHYLTESTOSTERONE 1.25; 2.5 MG/1; MG/1
TABLET ORAL
Qty: 30 TABLET | Refills: 0 | Status: SHIPPED | OUTPATIENT
Start: 2022-02-22 | End: 2022-03-31

## 2022-02-23 ENCOUNTER — LAB ENCOUNTER (OUTPATIENT)
Dept: LAB | Age: 57
End: 2022-02-23
Attending: OBSTETRICS & GYNECOLOGY
Payer: COMMERCIAL

## 2022-02-23 DIAGNOSIS — Z01.419 WELL WOMAN EXAM WITH ROUTINE GYNECOLOGICAL EXAM: ICD-10-CM

## 2022-02-23 LAB
ALBUMIN SERPL-MCNC: 3.9 G/DL (ref 3.4–5)
ALBUMIN/GLOB SERPL: 1.4 {RATIO} (ref 1–2)
ALP LIVER SERPL-CCNC: 51 U/L
ALT SERPL-CCNC: 29 U/L
ANION GAP SERPL CALC-SCNC: 2 MMOL/L (ref 0–18)
AST SERPL-CCNC: 24 U/L (ref 15–37)
BUN BLD-MCNC: 12 MG/DL (ref 7–18)
CALCIUM BLD-MCNC: 9.2 MG/DL (ref 8.5–10.1)
CHLORIDE SERPL-SCNC: 107 MMOL/L (ref 98–112)
CHOLEST SERPL-MCNC: 252 MG/DL (ref ?–200)
CO2 SERPL-SCNC: 31 MMOL/L (ref 21–32)
CREAT BLD-MCNC: 0.78 MG/DL
FASTING PATIENT LIPID ANSWER: YES
FASTING STATUS PATIENT QL REPORTED: YES
GLOBULIN PLAS-MCNC: 2.8 G/DL (ref 2.8–4.4)
GLUCOSE BLD-MCNC: 81 MG/DL (ref 70–99)
HDLC SERPL-MCNC: 75 MG/DL (ref 40–59)
LDLC SERPL CALC-MCNC: 165 MG/DL (ref ?–100)
NONHDLC SERPL-MCNC: 177 MG/DL (ref ?–130)
OSMOLALITY SERPL CALC.SUM OF ELEC: 289 MOSM/KG (ref 275–295)
POTASSIUM SERPL-SCNC: 4.8 MMOL/L (ref 3.5–5.1)
PROT SERPL-MCNC: 6.7 G/DL (ref 6.4–8.2)
SODIUM SERPL-SCNC: 140 MMOL/L (ref 136–145)
TRIGL SERPL-MCNC: 70 MG/DL (ref 30–149)
VLDLC SERPL CALC-MCNC: 14 MG/DL (ref 0–30)

## 2022-02-23 PROCEDURE — 80061 LIPID PANEL: CPT

## 2022-02-23 PROCEDURE — 80053 COMPREHEN METABOLIC PANEL: CPT

## 2022-03-31 RX ORDER — ESTERIFIED ESTROGEN AND METHYLTESTOSTERONE 1.25; 2.5 MG/1; MG/1
1 TABLET ORAL DAILY
Qty: 30 TABLET | Refills: 0 | Status: SHIPPED | OUTPATIENT
Start: 2022-03-31 | End: 2022-03-31

## 2022-03-31 RX ORDER — ESTERIFIED ESTROGEN AND METHYLTESTOSTERONE 1.25; 2.5 MG/1; MG/1
1 TABLET ORAL DAILY
Qty: 30 TABLET | Refills: 11 | Status: SHIPPED | OUTPATIENT
Start: 2022-03-31

## 2022-03-31 NOTE — TELEPHONE ENCOUNTER
Faxed request from [place] for controlled substance Est Estrgn methtest 1.25/2.5mg tab. Will route to Dr Yi Munoz.

## 2022-06-29 ENCOUNTER — TELEPHONE (OUTPATIENT)
Dept: OBGYN CLINIC | Facility: CLINIC | Age: 57
End: 2022-06-29

## 2022-06-29 NOTE — TELEPHONE ENCOUNTER
C/o left breast pain where she has a marker. Advised to be seen for evaluation, appt 1315 with KD 6/30, verb understanding.

## 2022-06-29 NOTE — TELEPHONE ENCOUNTER
Pt states she has a little pain near an area of her breast that she had a biopsy done a couple years ago and they put a marker in. Pt states she thinks she can feel the marker. Please advise if pt should get a mammogram done sooner than her Aug appt.

## 2022-06-30 ENCOUNTER — OFFICE VISIT (OUTPATIENT)
Dept: OBGYN CLINIC | Facility: CLINIC | Age: 57
End: 2022-06-30
Payer: COMMERCIAL

## 2022-06-30 VITALS
HEIGHT: 63 IN | SYSTOLIC BLOOD PRESSURE: 144 MMHG | WEIGHT: 136 LBS | BODY MASS INDEX: 24.1 KG/M2 | HEART RATE: 57 BPM | DIASTOLIC BLOOD PRESSURE: 97 MMHG

## 2022-06-30 DIAGNOSIS — N64.4 MASTALGIA: Primary | ICD-10-CM

## 2022-06-30 PROCEDURE — 3008F BODY MASS INDEX DOCD: CPT | Performed by: OBSTETRICS & GYNECOLOGY

## 2022-06-30 PROCEDURE — 99212 OFFICE O/P EST SF 10 MIN: CPT | Performed by: OBSTETRICS & GYNECOLOGY

## 2022-06-30 PROCEDURE — 3080F DIAST BP >= 90 MM HG: CPT | Performed by: OBSTETRICS & GYNECOLOGY

## 2022-06-30 PROCEDURE — 3077F SYST BP >= 140 MM HG: CPT | Performed by: OBSTETRICS & GYNECOLOGY

## 2022-06-30 NOTE — PROGRESS NOTES
She has never had high blood pressure her mother has high blood pressure her past 2 visits with physician have been elevated. She has appointment with her primary in a couple days and we will recheck it. She has a mammogram appointment in August.  No breast cancer in her family. She got a COVID booster within the last month or 2 since then she has been having noticing some pain going down her whole left side of her body to her groin. She has episodic pain in her left breast that comes and goes it is usually only when she pushes on it. She has not taken anything for it. No discharge from her nipples. Breast exams no obvious masses on either breast.  She says she has pain where she had the clip from the biopsy but that is not new. We will order her mammogram.  Check her blood pressure with her primary in 2 days.

## 2022-10-17 RX ORDER — ESTERIFIED ESTROGEN AND METHYLTESTOSTERONE 1.25; 2.5 MG/1; MG/1
TABLET ORAL
Qty: 30 TABLET | Refills: 0 | OUTPATIENT
Start: 2022-10-17

## 2022-10-19 ENCOUNTER — TELEPHONE (OUTPATIENT)
Dept: OBGYN CLINIC | Facility: CLINIC | Age: 57
End: 2022-10-19

## 2022-10-19 NOTE — TELEPHONE ENCOUNTER
Pt informed she is due for her WWE; scheduled 12/14/2022.  Medication request for esterified estrogens-methyltestosterone 1.25-2.5 MG Oral Tab sent to Dr. Sarthak Don for review and approval.   Last pap 4/19/2013  Last mammogram 8/7/2021

## 2022-10-20 RX ORDER — ESTERIFIED ESTROGEN AND METHYLTESTOSTERONE 1.25; 2.5 MG/1; MG/1
1 TABLET ORAL DAILY
Qty: 30 TABLET | Refills: 5 | Status: SHIPPED | OUTPATIENT
Start: 2022-10-20

## 2022-10-20 NOTE — TELEPHONE ENCOUNTER
Message left per HIPAA form informing pt that her refill has been sent to the pharmacy. To call with any questions.

## 2022-11-16 RX ORDER — VALACYCLOVIR HYDROCHLORIDE 500 MG/1
500 TABLET, FILM COATED ORAL DAILY
Qty: 90 TABLET | Refills: 5 | OUTPATIENT
Start: 2022-11-16

## 2022-11-16 RX ORDER — VALACYCLOVIR HYDROCHLORIDE 500 MG/1
TABLET, FILM COATED ORAL
Qty: 90 TABLET | Refills: 0 | Status: SHIPPED | OUTPATIENT
Start: 2022-11-16

## 2022-11-22 RX ORDER — ESTERIFIED ESTROGEN AND METHYLTESTOSTERONE 1.25; 2.5 MG/1; MG/1
1 TABLET ORAL DAILY
Qty: 30 TABLET | Refills: 5 | Status: SHIPPED | OUTPATIENT
Start: 2022-11-22

## 2022-12-29 ENCOUNTER — OFFICE VISIT (OUTPATIENT)
Facility: CLINIC | Age: 57
End: 2022-12-29
Payer: COMMERCIAL

## 2022-12-29 VITALS
BODY MASS INDEX: 25.4 KG/M2 | WEIGHT: 138 LBS | HEIGHT: 62 IN | SYSTOLIC BLOOD PRESSURE: 112 MMHG | DIASTOLIC BLOOD PRESSURE: 70 MMHG | HEART RATE: 72 BPM

## 2022-12-29 DIAGNOSIS — Z12.31 ENCOUNTER FOR SCREENING MAMMOGRAM FOR BREAST CANCER: Primary | ICD-10-CM

## 2022-12-29 DIAGNOSIS — Z79.890 POST-MENOPAUSE ON HRT (HORMONE REPLACEMENT THERAPY): ICD-10-CM

## 2022-12-29 DIAGNOSIS — Z01.419 WELL WOMAN EXAM WITH ROUTINE GYNECOLOGICAL EXAM: ICD-10-CM

## 2022-12-29 PROCEDURE — 3008F BODY MASS INDEX DOCD: CPT | Performed by: OBSTETRICS & GYNECOLOGY

## 2022-12-29 PROCEDURE — 3074F SYST BP LT 130 MM HG: CPT | Performed by: OBSTETRICS & GYNECOLOGY

## 2022-12-29 PROCEDURE — 99396 PREV VISIT EST AGE 40-64: CPT | Performed by: OBSTETRICS & GYNECOLOGY

## 2022-12-29 PROCEDURE — 3078F DIAST BP <80 MM HG: CPT | Performed by: OBSTETRICS & GYNECOLOGY

## 2022-12-29 RX ORDER — ROSUVASTATIN CALCIUM 10 MG/1
10 TABLET, COATED ORAL NIGHTLY
COMMUNITY

## 2022-12-29 RX ORDER — ESTERIFIED ESTROGEN AND METHYLTESTOSTERONE 1.25; 2.5 MG/1; MG/1
1 TABLET ORAL DAILY
Qty: 30 TABLET | Refills: 5 | Status: SHIPPED | OUTPATIENT
Start: 2022-12-29

## 2023-03-15 RX ORDER — VALACYCLOVIR HYDROCHLORIDE 500 MG/1
TABLET, FILM COATED ORAL
Qty: 90 TABLET | Refills: 1 | Status: SHIPPED | OUTPATIENT
Start: 2023-03-15

## 2023-07-21 RX ORDER — ESTERIFIED ESTROGEN AND METHYLTESTOSTERONE 1.25; 2.5 MG/1; MG/1
1 TABLET ORAL DAILY
Qty: 30 TABLET | Refills: 5 | Status: SHIPPED | OUTPATIENT
Start: 2023-07-21

## 2023-09-18 NOTE — TELEPHONE ENCOUNTER
Patient is requesting er prescription for hormone replacement to to be sent to the   [facility] in [place], [place] ph. [phone]. The new prescription was just sent to [facility] in [facility]. Patient was told because its a new request, never filled they can't transfer it. Patient will be leaving for [facility] on Thursday.

## 2023-09-18 NOTE — TELEPHONE ENCOUNTER
Pt is requesting a refill of her esterified estrogens-methyltestosterone sent to the [facility] in [address] [place] [address]. She is going out of the country on Thursday. Order pended.

## 2023-09-19 RX ORDER — ESTERIFIED ESTROGEN AND METHYLTESTOSTERONE 1.25; 2.5 MG/1; MG/1
1 TABLET ORAL DAILY
Qty: 30 TABLET | Refills: 0 | Status: SHIPPED | OUTPATIENT
Start: 2023-09-19

## 2023-09-20 NOTE — TELEPHONE ENCOUNTER
Message left per HIPAA form letting pt know her refill has been sent to the [facility] in [place], [place]. To call with any questions.

## 2023-10-30 RX ORDER — ESTERIFIED ESTROGEN AND METHYLTESTOSTERONE 1.25; 2.5 MG/1; MG/1
1 TABLET ORAL DAILY
Qty: 30 TABLET | Refills: 0 | Status: SHIPPED | OUTPATIENT
Start: 2023-10-30

## 2023-11-22 ENCOUNTER — PATIENT MESSAGE (OUTPATIENT)
Facility: CLINIC | Age: 58
End: 2023-11-22

## 2023-11-22 RX ORDER — ESTERIFIED ESTROGEN AND METHYLTESTOSTERONE 1.25; 2.5 MG/1; MG/1
1 TABLET ORAL DAILY
Qty: 30 TABLET | Refills: 0 | Status: SHIPPED | OUTPATIENT
Start: 2023-11-22

## 2023-11-22 NOTE — TELEPHONE ENCOUNTER
From: Ella Sebastian  To: Crista Romero  Sent: 11/22/2023 8:39 AM CST  Subject: MethTest    Hi,  I need to refill this prescription but am traveling and would like to pick it up in [facility]. I did this in September and it seems to be difficult to accomplish. I need it next week. [address] [facility], [address]  Thank you and Happy Thanksgiving!

## 2023-11-28 RX ORDER — ESTERIFIED ESTROGEN AND METHYLTESTOSTERONE 1.25; 2.5 MG/1; MG/1
1 TABLET ORAL DAILY
Qty: 30 TABLET | Refills: 0 | Status: SHIPPED | OUTPATIENT
Start: 2023-11-28

## 2023-12-01 ENCOUNTER — TELEPHONE (OUTPATIENT)
Facility: CLINIC | Age: 58
End: 2023-12-01

## 2023-12-01 DIAGNOSIS — Z01.419 WELL WOMAN EXAM WITH ROUTINE GYNECOLOGICAL EXAM: Primary | ICD-10-CM

## 2023-12-01 NOTE — TELEPHONE ENCOUNTER
Pt is requesting routine blood work prior to her 1/29/24 appointment. Orders pended. Will call pt once orders have been placed.

## 2023-12-01 NOTE — TELEPHONE ENCOUNTER
Pt scheduled WWE with KD for 01/2024, requested to have orders for blood work put in prior to appt.  Please advise and call pt when ready

## 2024-01-04 DIAGNOSIS — Z79.890 POST-MENOPAUSE ON HRT (HORMONE REPLACEMENT THERAPY): Primary | ICD-10-CM

## 2024-01-10 ENCOUNTER — TELEPHONE (OUTPATIENT)
Facility: CLINIC | Age: 59
End: 2024-01-10

## 2024-01-10 RX ORDER — ESTERIFIED ESTROGEN AND METHYLTESTOSTERONE 1.25; 2.5 MG/1; MG/1
1 TABLET ORAL DAILY
Qty: 30 TABLET | Refills: 0 | Status: SHIPPED | OUTPATIENT
Start: 2024-01-10

## 2024-01-10 NOTE — TELEPHONE ENCOUNTER
Spoke with pt. States she spoke with someone last week and was told her refill has been sent. No documentation on conversation. Refill request received 12/4 & routed for review. Pt needs rx today, she is out. I let her know I routed to another provider to sign but is unable to send at this time due to an issue with sending controlled substances electronically. Will follow up with pt later this afternoon. Verbalized understanding.

## 2024-01-10 NOTE — TELEPHONE ENCOUNTER
Pt called upset stating she had requested a refill last week and was told it was going to be sent to the pharmacy but still isn't available to be picked up. Pt requested to speak with someone ASAP

## 2024-01-29 ENCOUNTER — OFFICE VISIT (OUTPATIENT)
Facility: CLINIC | Age: 59
End: 2024-01-29
Payer: COMMERCIAL

## 2024-01-29 VITALS
BODY MASS INDEX: 24.29 KG/M2 | WEIGHT: 132 LBS | HEART RATE: 67 BPM | DIASTOLIC BLOOD PRESSURE: 80 MMHG | HEIGHT: 62 IN | SYSTOLIC BLOOD PRESSURE: 130 MMHG

## 2024-01-29 DIAGNOSIS — Z01.419 WELL WOMAN EXAM WITH ROUTINE GYNECOLOGICAL EXAM: Primary | ICD-10-CM

## 2024-01-29 DIAGNOSIS — Z12.31 ENCOUNTER FOR SCREENING MAMMOGRAM FOR BREAST CANCER: ICD-10-CM

## 2024-01-29 DIAGNOSIS — Z79.890 POST-MENOPAUSE ON HRT (HORMONE REPLACEMENT THERAPY): ICD-10-CM

## 2024-01-29 RX ORDER — ESTERIFIED ESTROGEN AND METHYLTESTOSTERONE 1.25; 2.5 MG/1; MG/1
1 TABLET ORAL DAILY
Qty: 90 TABLET | Refills: 5 | Status: SHIPPED | OUTPATIENT
Start: 2024-01-29

## 2024-01-29 RX ORDER — MULTIVIT WITH MINERALS/LUTEIN
1000 TABLET ORAL DAILY
COMMUNITY

## 2024-01-29 RX ORDER — CYANOCOBALAMIN (VITAMIN B-12) 500 MCG
TABLET ORAL
COMMUNITY

## 2024-01-29 RX ORDER — VALACYCLOVIR HYDROCHLORIDE 500 MG/1
500 TABLET, FILM COATED ORAL DAILY
Qty: 90 TABLET | Refills: 1 | Status: SHIPPED | OUTPATIENT
Start: 2024-01-29

## 2024-01-29 NOTE — PROGRESS NOTES
Lulú Bailey is a 59 year old female  Patient's last menstrual period was 2007.   Chief Complaint   Patient presents with    Wellness Visit   .     Is on estrogen and testosterone.  She does not have a uterus.  Hot flashes or night sweats risk of stroke DVTs were discussed.  She has slightly elevated cholesterol and is on cholesterol medicine.  She does not want to stop either one of the hormones.  She had a colonoscopy she has 2 more years before she needs to repeat that.  Her primary does her blood work.  She has gotten a flu shot.  She did not get the RSV.  She takes vitamin D.    OBSTETRICS HISTORY:  OB History    Para Term  AB Living   5 3 3   2 3   SAB IAB Ectopic Multiple Live Births   2              # Outcome Date GA Lbr Ajit/2nd Weight Sex Delivery Anes PTL Lv   5 Term 95    M NORMAL SPONT      4 Term 87    M NORMAL SPONT      3 Term 85    M NORMAL SPONT      2 SAB            1 SAB                GYNE HISTORY:  Periods none due to menopause    History   Sexual Activity    Sexual activity: Yes    Partners: Male    Birth control/ protection: Hysterectomy                 MEDICAL HISTORY:  Past Medical History:   Diagnosis Date    Fibrocystic breast     PONV (postoperative nausea and vomiting)        SURGICAL HISTORY:  Past Surgical History:   Procedure Laterality Date    Colonoscopy  2015    Hysterectomy  2009    SHEILA/BSO  silvestre,     anemia    Implant left  2004    Saline    Implant right      Saline    Needle biopsy left  16    U/S guided bx (12:00,4cmfn)- no visible scar    Tonsillectomy      Upper gi endoscopy,exam         SOCIAL HISTORY:  Social History     Socioeconomic History    Marital status:      Spouse name: Not on file    Number of children: Not on file    Years of education: Not on file    Highest education level: Not on file   Occupational History    Not on file   Tobacco Use    Smoking status: Never    Smokeless tobacco: Never    Vaping Use    Vaping Use: Never used   Substance and Sexual Activity    Alcohol use: Yes     Comment: WINE DAILY    Drug use: No    Sexual activity: Yes     Partners: Male     Birth control/protection: Hysterectomy   Other Topics Concern     Service Not Asked    Blood Transfusions Not Asked    Caffeine Concern No     Comment: Coffee    Occupational Exposure Not Asked    Hobby Hazards Not Asked    Sleep Concern Not Asked    Stress Concern Not Asked    Weight Concern Not Asked    Special Diet Not Asked    Back Care Not Asked    Exercise Yes     Comment: Daily    Bike Helmet Not Asked    Seat Belt Not Asked    Self-Exams Not Asked   Social History Narrative    Not on file     Social Determinants of Health     Financial Resource Strain: Not on file   Food Insecurity: Not on file   Transportation Needs: Not on file   Physical Activity: Not on file   Stress: Not on file   Social Connections: Not on file   Housing Stability: Not on file       FAMILY HISTORY:  Family History   Problem Relation Age of Onset    Heart Disease Father     No Known Problems Mother     No Known Problems Maternal Grandmother     No Known Problems Maternal Grandfather     No Known Problems Paternal Grandmother     No Known Problems Paternal Grandfather     No Known Problems Other        MEDICATIONS:    Current Outpatient Medications:     Ascorbic Acid (VITAMIN C) 1000 MG Oral Tab, Take 1 tablet (1,000 mg total) by mouth daily., Disp: , Rfl:     Stress Formula/Zinc Oral Tab, Take 1 tablet by mouth daily., Disp: , Rfl:     Omega-3 Fatty Acids (FISH OIL) 300 MG Oral Cap, Take by mouth., Disp: , Rfl:     Probiotic Product (CULTURELLE PROBIOTICS OR), Take by mouth., Disp: , Rfl:     UBIQUINOL OR, Take by mouth As Directed., Disp: , Rfl:     ESTERIFIED ESTROGENS-METHYLTESTOSTERONE 1.25-2.5 MG Oral Tab, TAKE 1 TABLET BY MOUTH DAILY, Disp: 30 tablet, Rfl: 0    VALACYCLOVIR 500 MG Oral Tab, TAKE 1 TABLET(500 MG) BY MOUTH DAILY, Disp: 90 tablet,  Rfl: 1    Cholecalciferol (VITAMIN D-3) 5000 UNITS Oral Tab, Take 1 tablet (5,000 Units total) by mouth daily., Disp: , Rfl:     rosuvastatin 10 MG Oral Tab, Take 10 mg by mouth nightly. (Patient not taking: Reported on 1/29/2024), Disp: , Rfl:     Cetirizine HCl (ZYRTEC OR), Take by mouth. (Patient not taking: Reported on 1/29/2024), Disp: , Rfl:     ALLERGIES:  No Known Allergies      Review of Systems:  Constitutional:  Denies fatigue, night sweats, hot flashes  Gastrointestinal:  denies heartburn, abdominal pain, diarrhea or constipation  Genitourinary:  denies dysuria, incontinence, abnormal vaginal discharge, vaginal itching  Skin/Breast:  Denies any breast pain, lumps, or discharge.   Neurological:  denies headaches, extremity weakness or numbness.      PHYSICAL EXAM:   Constitutional: well developed, well nourished  Head/Face: normocephalic  Neck/Thyroid: thyroid symmetric, no thyromegaly, no nodules, no adenopathy  Breast: normal without palpable masses, tenderness, asymmetry, nipple discharge, nipple retraction or skin changes  Abdomen:  soft, nontender, nondistended, no masses  Skin/Hair: no unusual rashes or bruises   Extremities: no edema, no cyanosis  Psychiatric:  Oriented to time, place, person and situation. Appropriate mood and affect    Pelvic Exam:  External Genitalia: normal appearance, hair distribution, and no lesions  Urethral Meatus:  normal in size, location, without lesions and prolapse  Bladder:  No fullness, masses or tenderness  Vagina:  Normal appearance without lesions, no abnormal discharge  Cervix: Absent  Uterus: Absent  Adnexa: normal without masses or tenderness  Perineum: normal  Anus: no hemorroids     Assessment & Plan:  Lulú was seen today for wellness visit.    Diagnoses and all orders for this visit:    Well woman exam with routine gynecological exam    Encounter for screening mammogram for breast cancer  -     [facility] HANSA 2D+3D SCREENING BILAT (CPT=77067/15650);  Future        Estrogen and testosterone replacement.

## 2024-08-01 DIAGNOSIS — Z79.890 POST-MENOPAUSE ON HRT (HORMONE REPLACEMENT THERAPY): ICD-10-CM

## 2024-08-01 RX ORDER — ESTERIFIED ESTROGEN AND METHYLTESTOSTERONE 1.25; 2.5 MG/1; MG/1
1 TABLET ORAL DAILY
Qty: 90 TABLET | Refills: 0 | OUTPATIENT
Start: 2024-08-01

## 2024-08-11 DIAGNOSIS — Z79.890 POST-MENOPAUSE ON HRT (HORMONE REPLACEMENT THERAPY): ICD-10-CM

## 2024-08-12 DIAGNOSIS — Z79.890 POST-MENOPAUSE ON HRT (HORMONE REPLACEMENT THERAPY): ICD-10-CM

## 2024-08-12 RX ORDER — ESTERIFIED ESTROGEN AND METHYLTESTOSTERONE 1.25; 2.5 MG/1; MG/1
1 TABLET ORAL DAILY
Qty: 90 TABLET | Refills: 0 | OUTPATIENT
Start: 2024-08-12

## 2024-08-12 RX ORDER — ESTERIFIED ESTROGEN AND METHYLTESTOSTERONE 1.25; 2.5 MG/1; MG/1
1 TABLET ORAL DAILY
Qty: 90 TABLET | Refills: 1 | Status: SHIPPED | OUTPATIENT
Start: 2024-08-12

## 2024-08-16 ENCOUNTER — TELEPHONE (OUTPATIENT)
Facility: CLINIC | Age: 59
End: 2024-08-16

## 2024-08-16 DIAGNOSIS — Z79.890 POST-MENOPAUSE ON HRT (HORMONE REPLACEMENT THERAPY): ICD-10-CM

## 2024-08-16 RX ORDER — ESTERIFIED ESTROGEN AND METHYLTESTOSTERONE 1.25; 2.5 MG/1; MG/1
1 TABLET ORAL DAILY
Qty: 90 TABLET | Refills: 1 | Status: SHIPPED | OUTPATIENT
Start: 2024-08-16

## 2024-08-16 RX ORDER — ESTERIFIED ESTROGEN AND METHYLTESTOSTERONE 1.25; 2.5 MG/1; MG/1
1 TABLET ORAL DAILY
Qty: 90 TABLET | Refills: 0 | OUTPATIENT
Start: 2024-08-16

## 2024-08-18 DIAGNOSIS — Z79.890 POST-MENOPAUSE ON HRT (HORMONE REPLACEMENT THERAPY): ICD-10-CM

## 2024-08-19 ENCOUNTER — TELEPHONE (OUTPATIENT)
Facility: CLINIC | Age: 59
End: 2024-08-19

## 2024-08-19 DIAGNOSIS — Z01.419 WELL WOMAN EXAM WITH ROUTINE GYNECOLOGICAL EXAM: Primary | ICD-10-CM

## 2024-08-19 RX ORDER — ESTERIFIED ESTROGEN AND METHYLTESTOSTERONE .625; 1.25 MG/1; MG/1
1 TABLET ORAL DAILY
Qty: 30 TABLET | Refills: 5 | Status: SHIPPED | OUTPATIENT
Start: 2024-08-19

## 2024-08-19 RX ORDER — ESTERIFIED ESTROGEN AND METHYLTESTOSTERONE 1.25; 2.5 MG/1; MG/1
1 TABLET ORAL DAILY
Qty: 90 TABLET | Refills: 0 | OUTPATIENT
Start: 2024-08-19

## 2024-08-19 NOTE — TELEPHONE ENCOUNTER
LEFT MESSAGE for patient that her prescription was received at her [facility] and to call if any issues.

## 2024-08-30 ENCOUNTER — PATIENT MESSAGE (OUTPATIENT)
Facility: CLINIC | Age: 59
End: 2024-08-30

## 2024-08-30 DIAGNOSIS — Z79.890 POST-MENOPAUSE ON HRT (HORMONE REPLACEMENT THERAPY): ICD-10-CM

## 2024-09-03 NOTE — TELEPHONE ENCOUNTER
From: Lulú Bailey  To: Reema Figueroa  Sent: 8/30/2024 5:42 PM CDT  Subject: Hormone Replacement    I finally received my prescription but it is only for 30 days. Last time I got 90 days and that was much better because it takes an act of congress to get this filled. I am traveling at the end of september and would love to not have deal with this. Can I please have another 60 days?

## 2024-09-06 NOTE — TELEPHONE ENCOUNTER
Patient called to speak with a nurse on her medication. Patient states she should have been dispensed for 90 days rather than 30 day refills. Patient states she discussed this prescription with Dr. Figueroa.   yes

## 2024-09-06 NOTE — TELEPHONE ENCOUNTER
Spoke with patient, states most recent prescription she has at home is for Est Estrogens-Methyltest 0.625-1.25 MG Oral Tab. Per patient it should never have been switched from esterified estrogens-methyltestosterone 1.25-2.5 MG Oral Tab which is what she was previously taking.    Patient upset the wrong  dosage was sent to pharmacy and wants to know why her medication was changed without a discussion. Patient states she noticed the pill was a different color but overlooked the dosage difference.     Pharmacy confirmed. Patient concerned she is leaving out of town Wednesday and pharmacy may not have medication in stock. Advised patient to contact pharmacy to verify and if not in stock can try reaching out to other local pharmacies who may have it in stock. Understanding verbalized.     Order for correct medication (EST ESTROGENS-METHYLTEST 1.25-2.5 MG OR TABS ) placed and routed to Dr. Figueroa for review and signature.

## 2024-09-06 NOTE — TELEPHONE ENCOUNTER
Spoke with patient and attempted to clarify which of the two medications: Est Estrogens-Methyltest 0.625-1.25 MG Oral Tab  or esterified estrogens-methyltestosterone 1.25-2.5 MG Oral Tab needed to be sent to pharmacy for 90 day supply. Patient was upset because she stated she had only ever been on one medication and wanted me to fill whatever was given to her in May for 90 day supply as she did not have access to medication container to confirm dosage.   Advised patient that neither medication was reordered in May and I would need to confirm medication and dosage prior to ordering. Advised patient to call in later today or Monday wit medication name and dosage or send photo of medication container via [employer].   Patient stated it was difficult to get in touch with nursing staff and [employer] messages were not answered. Advised patient her previous [employer] message was responded to before 24 business hours.   Patient agrees to call in before office closure or send [employer] message with requested information.

## 2024-09-09 RX ORDER — ESTERIFIED ESTROGEN AND METHYLTESTOSTERONE 1.25; 2.5 MG/1; MG/1
1 TABLET ORAL DAILY
Qty: 90 TABLET | Refills: 1 | Status: SHIPPED | OUTPATIENT
Start: 2024-09-09

## 2024-09-12 DIAGNOSIS — Z79.890 POST-MENOPAUSE ON HRT (HORMONE REPLACEMENT THERAPY): ICD-10-CM

## 2024-09-13 RX ORDER — ESTERIFIED ESTROGEN AND METHYLTESTOSTERONE 1.25; 2.5 MG/1; MG/1
1 TABLET ORAL DAILY
Qty: 90 TABLET | Refills: 0 | Status: SHIPPED | OUTPATIENT
Start: 2024-09-13

## 2024-12-09 ENCOUNTER — PATIENT MESSAGE (OUTPATIENT)
Facility: CLINIC | Age: 59
End: 2024-12-09

## 2024-12-09 DIAGNOSIS — Z79.890 POST-MENOPAUSE ON HRT (HORMONE REPLACEMENT THERAPY): ICD-10-CM

## 2024-12-09 RX ORDER — ESTERIFIED ESTROGEN AND METHYLTESTOSTERONE 1.25; 2.5 MG/1; MG/1
1 TABLET ORAL DAILY
Qty: 90 TABLET | Refills: 0 | Status: SHIPPED | OUTPATIENT
Start: 2024-12-09

## 2024-12-10 RX ORDER — ESTERIFIED ESTROGEN AND METHYLTESTOSTERONE 1.25; 2.5 MG/1; MG/1
1 TABLET ORAL DAILY
Qty: 90 TABLET | Refills: 0 | Status: SHIPPED | OUTPATIENT
Start: 2024-12-10

## 2025-01-17 ENCOUNTER — HOSPITAL ENCOUNTER (OUTPATIENT)
Dept: MAMMOGRAPHY | Age: 60
Discharge: HOME | End: 2025-01-17
Attending: OBSTETRICS & GYNECOLOGY
Payer: COMMERCIAL

## 2025-01-17 DIAGNOSIS — Z12.31 ENCOUNTER FOR SCREENING MAMMOGRAM FOR BREAST CANCER: ICD-10-CM

## 2025-01-17 PROCEDURE — 77067 SCR MAMMO BI INCL CAD: CPT | Performed by: OBSTETRICS & GYNECOLOGY

## 2025-01-17 PROCEDURE — 77063 BREAST TOMOSYNTHESIS BI: CPT | Performed by: OBSTETRICS & GYNECOLOGY

## 2025-02-06 ENCOUNTER — OFFICE VISIT (OUTPATIENT)
Facility: CLINIC | Age: 60
End: 2025-02-06
Payer: COMMERCIAL

## 2025-02-06 VITALS
BODY MASS INDEX: 24.29 KG/M2 | SYSTOLIC BLOOD PRESSURE: 120 MMHG | WEIGHT: 132 LBS | DIASTOLIC BLOOD PRESSURE: 76 MMHG | HEART RATE: 85 BPM | HEIGHT: 62 IN

## 2025-02-06 DIAGNOSIS — R92.333 HETEROGENEOUSLY DENSE TISSUE OF BOTH BREASTS ON MAMMOGRAPHY: ICD-10-CM

## 2025-02-06 DIAGNOSIS — Z01.419 ENCOUNTER FOR ANNUAL ROUTINE GYNECOLOGICAL EXAMINATION: Primary | ICD-10-CM

## 2025-02-06 DIAGNOSIS — Z79.890 POST-MENOPAUSE ON HRT (HORMONE REPLACEMENT THERAPY): ICD-10-CM

## 2025-02-06 PROCEDURE — 99459 PELVIC EXAMINATION: CPT | Performed by: STUDENT IN AN ORGANIZED HEALTH CARE EDUCATION/TRAINING PROGRAM

## 2025-02-06 PROCEDURE — 99396 PREV VISIT EST AGE 40-64: CPT | Performed by: STUDENT IN AN ORGANIZED HEALTH CARE EDUCATION/TRAINING PROGRAM

## 2025-02-06 RX ORDER — ESTERIFIED ESTROGEN AND METHYLTESTOSTERONE 1.25; 2.5 MG/1; MG/1
1 TABLET ORAL DAILY
Qty: 90 TABLET | Refills: 4 | Status: SHIPPED | OUTPATIENT
Start: 2025-02-06

## 2025-02-06 NOTE — PROGRESS NOTES
[facility]  Obstetrics and Gynecology   History & Physical    The  [employer] Cures Act makes medical notes like these available to patients in the interest of transparency. Please be advised this is a medical document. Medical documents are intended to carry relevant information, facts as evident, and the clinical opinion of the practitioner. The medical note is intended as peer to peer communication and may appear blunt or direct. It is written in medical language and may contain abbreviations or verbiage that are unfamiliar.     Chief complaint:   Chief Complaint   Patient presents with    Wellness Visit    Other     Discuss hormone pills.  Reviewed Preventative/Wellness form with patient        HPI: Lulú Bailey is a 60 year old  with Patient's last menstrual period was 2007.      Here for annual GYN exam  Pt is s/p total laparoscopic hysterectomy with BSO in  for AUB, fibroids  Has been on estratest HRT for years, wondering if it is recommended to stop it at some point. Helped with hot flashes, sleep etc.  PCP aware she is taking estrogen/testosterone HRT, not concerned with pt lipids  Still not much libido  Some vulvar irritation after intercourse, isn't an issue with lubrication, she will develop small bumps on the skin then will use some  jojoba oil and cortisone ointment which improves it. Gets similar issue on mons with shaving    Reports abnormal paps but almost 40 years ago, normal after that       PMHx/Surghx reviewed, below. Of note: HLD but not on statin, was never on statin, they did calcium score and she is not yet at the point where it is indicated per pt.   Breast biopsy (benign), breast implants. osteopenia  Famhx: no family hx breast cancer, had an aunt with ovarian cancer in her 80s    PCP: Mason Alanis MD    Review of Systems:  Constitutional:  Denies fatigue, night sweats, hot flashes  Eyes:  denies blurred or double vision  Cardiovascular:  denies chest pain or  palpitations  Respiratory:  denies shortness of breath  Gastrointestinal:  denies heartburn, abdominal pain, diarrhea or constipation  Genitourinary:  denies dysuria, incontinence, abnormal vaginal discharge, vaginal itching  Musculoskeletal:  denies back pain.  Skin/Breast:  Denies any breast pain, lumps, or discharge.   Neurological:  denies headaches, extremity weakness or numbness.  Psychiatric: denies depression or anxiety.  Endocrine:   denies excessive thirst or urination.  Heme/Lymph:  denies history of anemia, easy bruising or bleeding.    OB History:  OB History    Para Term  AB Living   5 3 3   2 3   SAB IAB Ectopic Multiple Live Births   2              # Outcome Date GA Lbr Ajit/2nd Weight Sex Type Anes PTL Lv   5 Term 95    M NORMAL SPONT      4 Term 86    M NORMAL SPONT      3 Term 85    M NORMAL SPONT      2 SAB            1 SAB                Meds:  Medications Ordered Prior to Encounter[1]    All:  Allergies[2]    PMH:  Past Medical History:    Fibrocystic breast    PONV (postoperative nausea and vomiting)       PSH:  Past Surgical History:   Procedure Laterality Date    Colonoscopy      Hysterectomy  2009    SHEILA/BSO  silvestre,     anemia    Implant left  2004    Saline    Implant right  2004    Saline    Needle biopsy left  15    U/S guided bx (12:00,4cmfn)- no visible scar    Tonsillectomy      Upper gi endoscopy,exam         Social History:  Social History     Socioeconomic History    Marital status:      Spouse name: Not on file    Number of children: Not on file    Years of education: Not on file    Highest education level: Not on file   Occupational History    Not on file   Tobacco Use    Smoking status: Never    Smokeless tobacco: Never   Vaping Use    Vaping status: Never Used   Substance and Sexual Activity    Alcohol use: Yes     Comment: WINE DAILY    Drug use: No    Sexual activity: Yes     Partners: Male     Birth control/protection:  Hysterectomy   Other Topics Concern     Service Not Asked    Blood Transfusions Not Asked    Caffeine Concern No     Comment: Coffee    Occupational Exposure Not Asked    Hobby Hazards Not Asked    Sleep Concern Not Asked    Stress Concern Not Asked    Weight Concern Not Asked    Special Diet Not Asked    Back Care Not Asked    Exercise Yes     Comment: Daily    Bike Helmet Not Asked    Seat Belt Not Asked    Self-Exams Not Asked   Social History Narrative    Not on file     Social Drivers of Health     Food Insecurity: Not on file   Transportation Needs: Not on file   Stress: Not on file   Housing Stability: Not on file        Family History:  Family History   Problem Relation Age of Onset    Heart Disease Father     No Known Problems Mother     No Known Problems Maternal Grandmother     No Known Problems Maternal Grandfather     No Known Problems Paternal Grandmother     No Known Problems Paternal Grandfather     No Known Problems Other        PHYSICAL EXAM:     Vitals:    02/06/25 1409   BP: 120/76   Pulse: 85   Weight: 132 lb (59.9 kg)   Height: 62\"       Body mass index is 24.14 kg/m².      Constitutional: well developed, well nourished  Head/Face: normocephalic  Breast: normal without palpable masses, tenderness, asymmetry, nipple discharge, nipple retraction or skin changes  Abdomen:  soft, nontender, nondistended, no masses  Skin/Hair: no unusual rashes or bruises  Extremities: no edema, no cyanosis  Psychiatric:  Oriented to time, place, person and situation. Appropriate mood and affect    Pelvic Exam:  External Genitalia: normal appearance, hair distribution, and no lesions  Urethral Meatus:  normal in size, location, without lesions and prolapse  Bladder:  No fullness, masses or tenderness  Vagina:  Normal appearance without lesions, no abnormal discharge  Cervix:  absent  Uterus: absent  Adnexa: normal without masses or tenderness  Perineum: normal      Assessment & Plan:     Lulú Bailey is a  60 year old      Diagnoses and all orders for this visit:    Encounter for annual routine gynecological examination    Heterogeneously dense tissue of both breasts on mammography  -     US BREAST BILATERAL COMPLETE (CPT=76641-50); Future    Post-menopause on HRT (hormone replacement therapy)  -     esterified estrogens-methyltestosterone 1.25-2.5 MG Oral Tab; Take 1 tablet by mouth daily.     Normal breast and pelvic exam s/p total hysterectomy/BSO  Discussed vulvar irritation after intercourse and shaving, to me sounds more like contact/irritant dermatitis, not sure if vaginal estrogen will provide much improvement but can consider  Discussed slightly increased risk of breast cancer or VTE with HRT after age 60 - pt is just on estrogen/testosterone without progestin due to hysterectomy, breast cancer risk is lower in this case. I feel it is reasonable to continue HRT for now. Discussed if she develops additional CV risk factors may want to revisit the discussion    Healthcare maintenance:  Pap: N/A, s/p hysterectomy, is >30 years since abnormal pap   Mammogram done 2025, US ordered  Colonoscopy due this year per GI note, pt follows with gi  DEXA, age 64 (earlier if postmenopausal with personal/fam hx fragility fx, underweight, smoking/excessive ETOH, steroids, RA): has ordered by PCP    Funmi Palomo MD  [facility] - OBGYN         [1]   Current Outpatient Medications on File Prior to Visit   Medication Sig Dispense Refill    esterified estrogens-methyltestosterone 1.25-2.5 MG Oral Tab Take 1 tablet by mouth daily. 90 tablet 0    Ascorbic Acid (VITAMIN C) 1000 MG Oral Tab Take 1 tablet (1,000 mg total) by mouth daily.      Omega-3 Fatty Acids (FISH OIL) 300 MG Oral Cap Take by mouth.      Probiotic Product (CULTURELLE PROBIOTICS OR) Take by mouth.      UBIQUINOL OR Take by mouth As Directed.      valACYclovir 500 MG Oral Tab Take 1 tablet (500 mg total) by mouth daily. 90 tablet 1    Cetirizine HCl (ZYRTEC OR)  Take by mouth.      Cholecalciferol (VITAMIN D-3) 5000 UNITS Oral Tab Take 1 tablet (5,000 Units total) by mouth daily.       No current facility-administered medications on file prior to visit.   [2]   Allergies  Allergen Reactions    Amoxicillin-Pot Clavulanate NAUSEA ONLY    Mixed Feathers OTHER (SEE COMMENTS)     itchy

## 2025-07-07 ENCOUNTER — PATIENT MESSAGE (OUTPATIENT)
Facility: CLINIC | Age: 60
End: 2025-07-07

## 2025-07-07 ENCOUNTER — HOSPITAL ENCOUNTER (OUTPATIENT)
Dept: MAMMOGRAPHY | Facility: HOSPITAL | Age: 60
Discharge: HOME | End: 2025-07-07
Attending: STUDENT IN AN ORGANIZED HEALTH CARE EDUCATION/TRAINING PROGRAM
Payer: COMMERCIAL

## 2025-07-07 DIAGNOSIS — R92.333 HETEROGENEOUSLY DENSE TISSUE OF BOTH BREASTS ON MAMMOGRAPHY: ICD-10-CM

## 2025-07-07 PROCEDURE — 76641 ULTRASOUND BREAST COMPLETE: CPT | Performed by: STUDENT IN AN ORGANIZED HEALTH CARE EDUCATION/TRAINING PROGRAM

## 2025-07-08 NOTE — TELEPHONE ENCOUNTER
Spoke to patient, informed of radiology recommendations to have repeat ultrasound in 6 months. Patient verbalizes understanding.